# Patient Record
Sex: FEMALE | Race: WHITE | NOT HISPANIC OR LATINO | URBAN - METROPOLITAN AREA
[De-identification: names, ages, dates, MRNs, and addresses within clinical notes are randomized per-mention and may not be internally consistent; named-entity substitution may affect disease eponyms.]

---

## 2018-07-17 ENCOUNTER — APPOINTMENT (RX ONLY)
Dept: URBAN - METROPOLITAN AREA CLINIC 57 | Facility: CLINIC | Age: 38
Setting detail: DERMATOLOGY
End: 2018-07-17

## 2018-07-17 DIAGNOSIS — L65.0 TELOGEN EFFLUVIUM: ICD-10-CM

## 2018-07-17 PROBLEM — J45.909 UNSPECIFIED ASTHMA, UNCOMPLICATED: Status: ACTIVE | Noted: 2018-07-17

## 2018-07-17 PROBLEM — D23.61 OTHER BENIGN NEOPLASM OF SKIN OF RIGHT UPPER LIMB, INCLUDING SHOULDER: Status: ACTIVE | Noted: 2018-07-17

## 2018-07-17 PROBLEM — L65.9 NONSCARRING HAIR LOSS, UNSPECIFIED: Status: ACTIVE | Noted: 2018-07-17

## 2018-07-17 PROCEDURE — 99202 OFFICE O/P NEW SF 15 MIN: CPT

## 2018-07-17 PROCEDURE — ? COUNSELING

## 2018-07-17 PROCEDURE — ? OBSERVATION AND MEASURE

## 2018-07-17 PROCEDURE — ? RECOMMENDATIONS

## 2018-07-17 NOTE — PROCEDURE: RECOMMENDATIONS
Recommendations (Free Text): She should not put in hair in a ponytail for a while as there could be some ponytail stress. We spoke about doing a biopsy, but she declined for now. I reassured her that I d not think it is anything serious and I do not think a referral to endocrinology is necessary.
Detail Level: Simple
Recommendation Preamble: The following recommendations were made during the visit:

## 2018-07-17 NOTE — PROCEDURE: MIPS QUALITY
Detail Level: Detailed
Additional Notes: I verbally counseled the patient regarding smoking cessation.
Quality 226: Preventive Care And Screening: Tobacco Use: Screening And Cessation Intervention: Patient screened for tobacco and is a smoker AND received Cessation Counseling